# Patient Record
Sex: FEMALE | Race: WHITE | NOT HISPANIC OR LATINO | Employment: FULL TIME | URBAN - METROPOLITAN AREA
[De-identification: names, ages, dates, MRNs, and addresses within clinical notes are randomized per-mention and may not be internally consistent; named-entity substitution may affect disease eponyms.]

---
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
## 2022-04-30 ENCOUNTER — OFFICE VISIT (OUTPATIENT)
Dept: FAMILY MEDICINE | Facility: CLINIC | Age: 22
End: 2022-04-30
Payer: OTHER MISCELLANEOUS

## 2022-04-30 ENCOUNTER — ANCILLARY PROCEDURE (OUTPATIENT)
Dept: GENERAL RADIOLOGY | Facility: CLINIC | Age: 22
End: 2022-04-30
Attending: PHYSICIAN ASSISTANT
Payer: COMMERCIAL

## 2022-04-30 VITALS
OXYGEN SATURATION: 99 % | TEMPERATURE: 97.3 F | SYSTOLIC BLOOD PRESSURE: 119 MMHG | DIASTOLIC BLOOD PRESSURE: 76 MMHG | HEART RATE: 81 BPM

## 2022-04-30 DIAGNOSIS — M79.645 FINGER PAIN, LEFT: ICD-10-CM

## 2022-04-30 DIAGNOSIS — M79.645 FINGER PAIN, LEFT: Primary | ICD-10-CM

## 2022-04-30 PROCEDURE — 99203 OFFICE O/P NEW LOW 30 MIN: CPT

## 2022-04-30 PROCEDURE — 73140 X-RAY EXAM OF FINGER(S): CPT | Mod: LT | Performed by: RADIOLOGY

## 2022-04-30 NOTE — PROGRESS NOTES
Assessment & Plan     Finger pain, left  I recommended ice, ibuprofen 600 mg 3 times daily, and splinting or buddy taping.  She was provided a prefabricated finger splint for comfort.  She may wean the use of buddy taping or splinting at work as symptoms improve.  She has no limitations and may continue with her normal activities at work.  She may follow-up on an as-needed basis.  - XR Finger Left G/E 2 Views      [facility]  [facility]    Graham Luciano is a 21 year old female who presents to clinic today for the following health issues:  Chief Complaint   Patient presents with     Urgent Care     Work Comp     Pt states her coworker was carrying some trays and pt coworker dropped the trays onto her and injured her left pinky. Pt states it swelled up right away     HPI    Patient states she was at work this morning when another coworker dropped some heavy trays on her left little finger.  Injury occurred approximately 1 hour ago.  She has noted pain, bruising, and mild swelling.  She does have some discomfort with range of motion.  She has not noted any deformity.  She is right-hand dominant.      Review of Systems  Constitutional, HEENT, cardiovascular, pulmonary, gi and gu systems are negative, except as otherwise noted.      Objective    /76   Pulse 81   Temp 97.3  F (36.3  C) (Tympanic)   LMP 04/27/2022   SpO2 99%   Physical Exam   Examination of the left little finger reveals mild swelling at PIP joint dorsally.  Patient has mild tenderness to palpation at this joint.  There is no tenderness or swelling at the hand or metacarpophalangeal joint.  She has full nonpainful range of motion in extension.  She has mild discomfort with terminal flexion at PIP joint.  Sensation and peripheral pulses are intact.  There is no laxity with stress testing.    X-ray per my independent evaluation is negative for acute fracture or  dislocation of the left finger.

## 2022-04-30 NOTE — PATIENT INSTRUCTIONS
Try ice, ibuprofen 600 mg 3 x per day and splint.    You may return to work with use of the splint as needed for pain.    No restrictions as long as your finger is either splinted or buddy taped until your symptoms have resolved.